# Patient Record
Sex: MALE | Race: OTHER | HISPANIC OR LATINO | URBAN - METROPOLITAN AREA
[De-identification: names, ages, dates, MRNs, and addresses within clinical notes are randomized per-mention and may not be internally consistent; named-entity substitution may affect disease eponyms.]

---

## 2024-05-21 ENCOUNTER — EMERGENCY (EMERGENCY)
Facility: HOSPITAL | Age: 24
LOS: 1 days | Discharge: HOME | End: 2024-05-21
Attending: STUDENT IN AN ORGANIZED HEALTH CARE EDUCATION/TRAINING PROGRAM | Admitting: STUDENT IN AN ORGANIZED HEALTH CARE EDUCATION/TRAINING PROGRAM
Payer: SELF-PAY

## 2024-05-21 VITALS
HEART RATE: 105 BPM | SYSTOLIC BLOOD PRESSURE: 126 MMHG | RESPIRATION RATE: 20 BRPM | TEMPERATURE: 98 F | DIASTOLIC BLOOD PRESSURE: 92 MMHG | OXYGEN SATURATION: 99 %

## 2024-05-21 LAB
ALBUMIN SERPL ELPH-MCNC: 4.3 G/DL — SIGNIFICANT CHANGE UP (ref 3.3–5)
ALP SERPL-CCNC: 83 U/L — SIGNIFICANT CHANGE UP (ref 40–120)
ALT FLD-CCNC: 17 U/L — SIGNIFICANT CHANGE UP (ref 4–41)
ANION GAP SERPL CALC-SCNC: 13 MMOL/L — SIGNIFICANT CHANGE UP (ref 7–14)
AST SERPL-CCNC: 19 U/L — SIGNIFICANT CHANGE UP (ref 4–40)
BASOPHILS # BLD AUTO: 0.05 K/UL — SIGNIFICANT CHANGE UP (ref 0–0.2)
BASOPHILS NFR BLD AUTO: 0.7 % — SIGNIFICANT CHANGE UP (ref 0–2)
BILIRUB SERPL-MCNC: 0.6 MG/DL — SIGNIFICANT CHANGE UP (ref 0.2–1.2)
BLOOD GAS VENOUS COMPREHENSIVE RESULT: SIGNIFICANT CHANGE UP
BUN SERPL-MCNC: 14 MG/DL — SIGNIFICANT CHANGE UP (ref 7–23)
CALCIUM SERPL-MCNC: 9.3 MG/DL — SIGNIFICANT CHANGE UP (ref 8.4–10.5)
CHLORIDE SERPL-SCNC: 104 MMOL/L — SIGNIFICANT CHANGE UP (ref 98–107)
CO2 SERPL-SCNC: 24 MMOL/L — SIGNIFICANT CHANGE UP (ref 22–31)
CREAT SERPL-MCNC: 0.91 MG/DL — SIGNIFICANT CHANGE UP (ref 0.5–1.3)
EGFR: 122 ML/MIN/1.73M2 — SIGNIFICANT CHANGE UP
EOSINOPHIL # BLD AUTO: 0.02 K/UL — SIGNIFICANT CHANGE UP (ref 0–0.5)
EOSINOPHIL NFR BLD AUTO: 0.3 % — SIGNIFICANT CHANGE UP (ref 0–6)
GLUCOSE SERPL-MCNC: 95 MG/DL — SIGNIFICANT CHANGE UP (ref 70–99)
HCT VFR BLD CALC: 45.1 % — SIGNIFICANT CHANGE UP (ref 39–50)
HGB BLD-MCNC: 15.6 G/DL — SIGNIFICANT CHANGE UP (ref 13–17)
IANC: 4.09 K/UL — SIGNIFICANT CHANGE UP (ref 1.8–7.4)
IMM GRANULOCYTES NFR BLD AUTO: 0.3 % — SIGNIFICANT CHANGE UP (ref 0–0.9)
INR BLD: 1.04 RATIO — SIGNIFICANT CHANGE UP (ref 0.88–1.16)
LIDOCAIN IGE QN: 16 U/L — SIGNIFICANT CHANGE UP (ref 7–60)
LYMPHOCYTES # BLD AUTO: 2.4 K/UL — SIGNIFICANT CHANGE UP (ref 1–3.3)
LYMPHOCYTES # BLD AUTO: 33.6 % — SIGNIFICANT CHANGE UP (ref 13–44)
MCHC RBC-ENTMCNC: 32.2 PG — SIGNIFICANT CHANGE UP (ref 27–34)
MCHC RBC-ENTMCNC: 34.6 GM/DL — SIGNIFICANT CHANGE UP (ref 32–36)
MCV RBC AUTO: 93 FL — SIGNIFICANT CHANGE UP (ref 80–100)
MONOCYTES # BLD AUTO: 0.57 K/UL — SIGNIFICANT CHANGE UP (ref 0–0.9)
MONOCYTES NFR BLD AUTO: 8 % — SIGNIFICANT CHANGE UP (ref 2–14)
NEUTROPHILS # BLD AUTO: 4.09 K/UL — SIGNIFICANT CHANGE UP (ref 1.8–7.4)
NEUTROPHILS NFR BLD AUTO: 57.1 % — SIGNIFICANT CHANGE UP (ref 43–77)
NRBC # BLD: 0 /100 WBCS — SIGNIFICANT CHANGE UP (ref 0–0)
NRBC # FLD: 0 K/UL — SIGNIFICANT CHANGE UP (ref 0–0)
PLATELET # BLD AUTO: 297 K/UL — SIGNIFICANT CHANGE UP (ref 150–400)
POTASSIUM SERPL-MCNC: 3.8 MMOL/L — SIGNIFICANT CHANGE UP (ref 3.5–5.3)
POTASSIUM SERPL-SCNC: 3.8 MMOL/L — SIGNIFICANT CHANGE UP (ref 3.5–5.3)
PROT SERPL-MCNC: 7.4 G/DL — SIGNIFICANT CHANGE UP (ref 6–8.3)
PROTHROM AB SERPL-ACNC: 11.6 SEC — SIGNIFICANT CHANGE UP (ref 9.5–13)
RBC # BLD: 4.85 M/UL — SIGNIFICANT CHANGE UP (ref 4.2–5.8)
RBC # FLD: 12.7 % — SIGNIFICANT CHANGE UP (ref 10.3–14.5)
SODIUM SERPL-SCNC: 141 MMOL/L — SIGNIFICANT CHANGE UP (ref 135–145)
WBC # BLD: 7.15 K/UL — SIGNIFICANT CHANGE UP (ref 3.8–10.5)
WBC # FLD AUTO: 7.15 K/UL — SIGNIFICANT CHANGE UP (ref 3.8–10.5)

## 2024-05-21 PROCEDURE — 71046 X-RAY EXAM CHEST 2 VIEWS: CPT | Mod: 26

## 2024-05-21 PROCEDURE — 99285 EMERGENCY DEPT VISIT HI MDM: CPT

## 2024-05-21 RX ORDER — SODIUM CHLORIDE 9 MG/ML
1000 INJECTION INTRAMUSCULAR; INTRAVENOUS; SUBCUTANEOUS ONCE
Refills: 0 | Status: COMPLETED | OUTPATIENT
Start: 2024-05-21 | End: 2024-05-21

## 2024-05-21 RX ORDER — ACETAMINOPHEN 500 MG
1000 TABLET ORAL ONCE
Refills: 0 | Status: COMPLETED | OUTPATIENT
Start: 2024-05-21 | End: 2024-05-21

## 2024-05-21 RX ADMIN — SODIUM CHLORIDE 1000 MILLILITER(S): 9 INJECTION INTRAMUSCULAR; INTRAVENOUS; SUBCUTANEOUS at 22:25

## 2024-05-21 RX ADMIN — Medication 400 MILLIGRAM(S): at 22:25

## 2024-05-21 NOTE — ED PROVIDER NOTE - ATTENDING APP SHARED VISIT CONTRIBUTION OF CARE
25 yo M hx Down syndrome presenting with complaints of abdominal pain since Friday. Initially, pain was epigastric, now diffuse pain. No reported nv, diarrhea or constipation. Denies testicular pain. No reported dysuria/hematuria.    VITALS: reviewed  GEN: NAD, A & O x 4  HEAD/EYES: NCAT, EOMI, anicteric sclerae,   ENT: mucus membranes moist, oropharynx WNL, trachea midline,  RESP: lungs CTA with equal breath sounds bilaterally, chest wall nontender and atraumatic  CV: heart with reg rhythm S1, S2, distal pulses intact and symmetric bilaterally  ABDOMEN: soft, nondistended, diffuse ttp- no pain elicited with deep palpation of stethoscope, no palpable masses  : no CVAT  MSK: extremities atraumatic and nontender, no edema, no asymmetry.  SKIN: warm, dry, no rash, no bruising, no cyanosis. color appropriate for ethnicity  NEURO: alert, mentating appropriately, no facial asymmetry.   PSYCH: Affect appropriate      Plan for labs, meds, ct abd/pelv r/o appendicitis vs other acute pathology, reassess

## 2024-05-21 NOTE — ED PROVIDER NOTE - CLINICAL SUMMARY MEDICAL DECISION MAKING FREE TEXT BOX
24yM w/no stated pmhx presenting to the ED with abdominal pain. Pt reports pain began a 4 days ago, had been epigastric and mild. Pain then returned  yesterday and became severe today. Pt reports pain is diffuse but worst in epigastric and lower abdominal regions. Reports associated chills. Pt denies fever, nausea, vomiting, diarrhea, cp, sob, cough, headache, dizziness or any other concerns. Recent travel from [place]. On exam pt appears uncomfortable, tearful, +epigastric and periumbilical tenderness. Concern for appendicitis, PUD/gastritis, pancreatitis, no testicular pain or urinary complaints. Plan: cbc/cmp, lipase, CT abd/pelvis, pain control. 24yM w/pmhx down syndrome presenting to the ED with abdominal pain. Pt reports pain began a 4 days ago, had been epigastric and mild. Pain then returned  yesterday and became severe today. Pt reports pain is diffuse but worst in epigastric and lower abdominal regions. Reports associated chills. Pt denies fever, nausea, vomiting, diarrhea, cp, sob, cough, headache, dizziness or any other concerns. Recent travel from [place]. On exam pt appears uncomfortable, tearful, +epigastric and periumbilical tenderness. Concern for appendicitis, PUD/gastritis, pancreatitis, no testicular pain or urinary complaints. Plan: cbc/cmp, lipase, CT abd/pelvis, pain control.

## 2024-05-21 NOTE — ED ADULT TRIAGE NOTE - CHIEF COMPLAINT QUOTE
Patient c/o abdominal pain for one day. No nausea/vomiting/diarrhea. Subjective fevers at home. Last BM today.

## 2024-05-21 NOTE — ED ADULT NURSE NOTE - NSFALLUNIVINTERV_ED_ALL_ED
Bed/Stretcher in lowest position, wheels locked, appropriate side rails in place/Call bell, personal items and telephone in reach/Instruct patient to call for assistance before getting out of bed/chair/stretcher/Non-slip footwear applied when patient is off stretcher/[place] to call system/Physically safe environment - no spills, clutter or unnecessary equipment/Purposeful proactive rounding/Room/bathroom lighting operational, light cord in reach

## 2024-05-21 NOTE — ED PROVIDER NOTE - NSFOLLOWUPINSTRUCTIONS_ED_ALL_ED_FT
Abdominal Pain, Adult  A health care provider talking to a person during a medical exam.  Pain in the abdomen (abdominal pain) can be caused by many things. In most cases, it gets better with no treatment or by being treated at home. But in some cases, it can be serious.    Your health care provider will ask questions about your medical history and do a physical exam to try to figure out what is causing your pain.    Follow these instructions at home:  Medicines    Take over-the-counter and prescription medicines only as told by your provider.  Do not take medicines that help you poop (laxatives) unless told by your provider.  General instructions    Watch your condition for any changes.  Drink enough fluid to keep your pee (urine) pale yellow.    Contact a health care provider if:  Your pain changes, gets worse, or lasts longer than expected.  You have severe cramping or bloating in your abdomen, or you vomit.  Your pain gets worse with meals, after eating, or with certain foods.  You are constipated or have diarrhea for more than 2–3 days.  You are not hungry, or you lose weight without trying.  You have signs of dehydration. These may include:  Dark pee, very little pee, or no pee.  Cracked lips or dry mouth.  Sleepiness or weakness.  You have pain when you pee (urinate) or poop.  Your abdominal pain wakes you up at night.  You have blood in your pee.  You have a fever.    Get help right away if:  You cannot stop vomiting.  Your pain is only in one part of the abdomen. Pain on the right side could be caused by appendicitis.  You have bloody or black poop (stool), or poop that looks like tar.  You have trouble breathing.  You have chest pain.  These symptoms may be an emergency. Get help right away. Call 911.  Do not wait to see if the symptoms will go away.  Do not drive yourself to the hospital.  This information is not intended to replace advice given to you by your health care provider. Make sure you discuss any questions you have with your health care provider.

## 2024-05-21 NOTE — ED PROVIDER NOTE - OBJECTIVE STATEMENT
24yM w/no stated pmhx presenting to the ED with abdominal pain. Pt reports pain began a 4 days ago, had been epigastric and mild. Pain then returned  yesterday and became severe today. Pt reports pain is diffuse but worst in epigastric and lower abdominal regions. Reports associated chills. Pt denies fever, nausea, vomiting, diarrhea, cp, sob, cough, headache, dizziness or any other concerns. Recent travel from [place].   ID [id number], mother at bedside also providing collateral 24yM w/pmhx Down Syndrome presenting to the ED with abdominal pain. Pt reports pain began a 4 days ago, had been epigastric and mild. Pain then returned  yesterday and became severe today. Pt reports pain is diffuse but worst in epigastric and lower abdominal regions. Reports associated chills. Pt denies fever, nausea, vomiting, diarrhea, cp, sob, cough, headache, dizziness or any other concerns. Recent travel from [place].   ID [id number], mother at bedside also providing collateral

## 2024-05-21 NOTE — ED PROVIDER NOTE - CONSTITUTIONAL, MLM
normal... Pt appears uncomfortable, tearful, awake, alert, oriented to person, place, time/situation and in no apparent distress.

## 2024-05-21 NOTE — ED ADULT NURSE NOTE - OBJECTIVE STATEMENT
Pt received to wellness room 2. Pt A&O x4, ambulatory. Pt Arabic speaking. Pt c/o abdominal pain intermittent since Friday. Pt endorses severe lower abdominal pain. Pt endorses some rectal pain. Pt denies dizziness, headache, vision changes, chest pain, SOB, N/V/D/C, or urinary symptoms. Respirations even and unlabored. +2 pulses in all extremities. 20G IV placed in AC. Labs drawn and sent. Medicated as per PA orders. Safety maintained. Pending lab results and imaging. Pt received to wellness room 2. Pt A&O x4, ambulatory. Pt Yi speaking. Pt c/o abdominal pain intermittent since Friday. Pt endorses severe lower abdominal pain. Pt endorses some rectal pain. Pt denies dizziness, headache, vision changes, chest pain, SOB, N/V/D/C, or urinary symptoms. Respirations even and unlabored. +2 pulses in all extremities. 20G IV placed in left forearm. Labs drawn and sent. Medicated as per PA orders. Safety maintained. Pending lab results and imaging.

## 2024-05-21 NOTE — ED PROVIDER NOTE - PROGRESS NOTE DETAILS
JENI Hartman: Pt signed out to JENI Qureshi awaiting CT scan JENI Qureshi: Patient signed out to me pending CT results. Patient had persisting pain while still waiting for CT so morphine was ordered. CT resulted and was unremarkable for acute findings. Patient felt better in the ED after treatment. Pain likely due to constipation. Patient comfortable and stable for discharge with pcp follow up. Instructed to return to the ED immediately for any worsening symptoms or new concerns.

## 2024-05-21 NOTE — ED PROVIDER NOTE - PATIENT PORTAL LINK FT
You can access the FollowMyHealth Patient Portal offered by [facility] by registering at the following website: [email or web address]. By joining [employer]’s FollowMyHealth portal, you will also be able to view your health information using other applications (apps) compatible with our system.

## 2024-05-21 NOTE — ED PROVIDER NOTE - BOWEL SOUNDS
"Session #:11  Arrival time:10:25   Departure time:11:10  Total clinic time:45:00   TIME WORKLOAD HR RPD BP O2 sat %   RESTING   96 7 168/74 95   ARM ERGO 10 NDIAYE: 40                 100 7  98   NUSTEP 20 NDIAYE:40 104 8  93   RECOVERY   99 7 164/82 97   EXERCISE TIME 30          OXYGEN: NC 3L/min  EXERCISE SUMMARY: Patient able to complete 30:00 of aerobic exercise on two machines. No signs/symptoms noted or reported, vital sign responses appropriate. Patient stable and asymptomatic at discharge.  PLAN FOR NEXT SESSION: Continue exercise prescription with goal of 38:00-45:00 total exercise time.  EDUCATION:  "Airway Clearance." Patient verbalized appropriate understanding, instructed to ask any questions during subsequent sessions.         "
present x 4 quadrants

## 2024-05-22 VITALS
HEART RATE: 55 BPM | RESPIRATION RATE: 16 BRPM | DIASTOLIC BLOOD PRESSURE: 66 MMHG | SYSTOLIC BLOOD PRESSURE: 101 MMHG | TEMPERATURE: 98 F | OXYGEN SATURATION: 100 %

## 2024-05-22 LAB
APPEARANCE UR: CLEAR — SIGNIFICANT CHANGE UP
BILIRUB UR-MCNC: NEGATIVE — SIGNIFICANT CHANGE UP
COLOR SPEC: YELLOW — SIGNIFICANT CHANGE UP
DIFF PNL FLD: NEGATIVE — SIGNIFICANT CHANGE UP
GLUCOSE UR QL: NEGATIVE MG/DL — SIGNIFICANT CHANGE UP
KETONES UR-MCNC: NEGATIVE MG/DL — SIGNIFICANT CHANGE UP
LEUKOCYTE ESTERASE UR-ACNC: NEGATIVE — SIGNIFICANT CHANGE UP
NITRITE UR-MCNC: NEGATIVE — SIGNIFICANT CHANGE UP
PH UR: 6 — SIGNIFICANT CHANGE UP (ref 5–8)
PROT UR-MCNC: SIGNIFICANT CHANGE UP MG/DL
SP GR SPEC: 1.09 — HIGH (ref 1–1.03)
UROBILINOGEN FLD QL: 0.2 MG/DL — SIGNIFICANT CHANGE UP (ref 0.2–1)

## 2024-05-22 PROCEDURE — 74177 CT ABD & PELVIS W/CONTRAST: CPT | Mod: 26,MC

## 2024-05-22 RX ORDER — FAMOTIDINE 10 MG/ML
20 INJECTION INTRAVENOUS ONCE
Refills: 0 | Status: COMPLETED | OUTPATIENT
Start: 2024-05-22 | End: 2024-05-22

## 2024-05-22 RX ORDER — MORPHINE SULFATE 50 MG/1
4 CAPSULE, EXTENDED RELEASE ORAL ONCE
Refills: 0 | Status: DISCONTINUED | OUTPATIENT
Start: 2024-05-22 | End: 2024-05-22

## 2024-05-22 RX ADMIN — Medication 30 MILLILITER(S): at 00:19

## 2024-05-22 RX ADMIN — MORPHINE SULFATE 4 MILLIGRAM(S): 50 CAPSULE, EXTENDED RELEASE ORAL at 00:19

## 2024-05-22 RX ADMIN — FAMOTIDINE 20 MILLIGRAM(S): 10 INJECTION INTRAVENOUS at 00:19

## 2024-05-23 ENCOUNTER — EMERGENCY (EMERGENCY)
Facility: HOSPITAL | Age: 24
LOS: 1 days | Discharge: HOME | End: 2024-05-23
Attending: STUDENT IN AN ORGANIZED HEALTH CARE EDUCATION/TRAINING PROGRAM | Admitting: STUDENT IN AN ORGANIZED HEALTH CARE EDUCATION/TRAINING PROGRAM
Payer: SELF-PAY

## 2024-05-23 VITALS
DIASTOLIC BLOOD PRESSURE: 62 MMHG | OXYGEN SATURATION: 99 % | HEART RATE: 97 BPM | TEMPERATURE: 99 F | RESPIRATION RATE: 18 BRPM | SYSTOLIC BLOOD PRESSURE: 110 MMHG

## 2024-05-23 LAB
ALBUMIN SERPL ELPH-MCNC: 4.3 G/DL — SIGNIFICANT CHANGE UP (ref 3.3–5)
ALP SERPL-CCNC: 69 U/L — SIGNIFICANT CHANGE UP (ref 40–120)
ALT FLD-CCNC: 17 U/L — SIGNIFICANT CHANGE UP (ref 4–41)
ANION GAP SERPL CALC-SCNC: 14 MMOL/L — SIGNIFICANT CHANGE UP (ref 7–14)
AST SERPL-CCNC: 21 U/L — SIGNIFICANT CHANGE UP (ref 4–40)
BASOPHILS # BLD AUTO: 0.04 K/UL — SIGNIFICANT CHANGE UP (ref 0–0.2)
BASOPHILS NFR BLD AUTO: 0.5 % — SIGNIFICANT CHANGE UP (ref 0–2)
BILIRUB SERPL-MCNC: 1.3 MG/DL — HIGH (ref 0.2–1.2)
BUN SERPL-MCNC: 10 MG/DL — SIGNIFICANT CHANGE UP (ref 7–23)
CALCIUM SERPL-MCNC: 9.2 MG/DL — SIGNIFICANT CHANGE UP (ref 8.4–10.5)
CHLORIDE SERPL-SCNC: 103 MMOL/L — SIGNIFICANT CHANGE UP (ref 98–107)
CO2 SERPL-SCNC: 23 MMOL/L — SIGNIFICANT CHANGE UP (ref 22–31)
CREAT SERPL-MCNC: 0.8 MG/DL — SIGNIFICANT CHANGE UP (ref 0.5–1.3)
CULTURE RESULTS: SIGNIFICANT CHANGE UP
EGFR: 128 ML/MIN/1.73M2 — SIGNIFICANT CHANGE UP
EOSINOPHIL # BLD AUTO: 0.02 K/UL — SIGNIFICANT CHANGE UP (ref 0–0.5)
EOSINOPHIL NFR BLD AUTO: 0.3 % — SIGNIFICANT CHANGE UP (ref 0–6)
GLUCOSE SERPL-MCNC: 97 MG/DL — SIGNIFICANT CHANGE UP (ref 70–99)
HCT VFR BLD CALC: 43.9 % — SIGNIFICANT CHANGE UP (ref 39–50)
HGB BLD-MCNC: 14.8 G/DL — SIGNIFICANT CHANGE UP (ref 13–17)
IANC: 5.43 K/UL — SIGNIFICANT CHANGE UP (ref 1.8–7.4)
IMM GRANULOCYTES NFR BLD AUTO: 0.1 % — SIGNIFICANT CHANGE UP (ref 0–0.9)
LYMPHOCYTES # BLD AUTO: 1.98 K/UL — SIGNIFICANT CHANGE UP (ref 1–3.3)
LYMPHOCYTES # BLD AUTO: 24.9 % — SIGNIFICANT CHANGE UP (ref 13–44)
MCHC RBC-ENTMCNC: 31.2 PG — SIGNIFICANT CHANGE UP (ref 27–34)
MCHC RBC-ENTMCNC: 33.7 GM/DL — SIGNIFICANT CHANGE UP (ref 32–36)
MCV RBC AUTO: 92.4 FL — SIGNIFICANT CHANGE UP (ref 80–100)
MONOCYTES # BLD AUTO: 0.48 K/UL — SIGNIFICANT CHANGE UP (ref 0–0.9)
MONOCYTES NFR BLD AUTO: 6 % — SIGNIFICANT CHANGE UP (ref 2–14)
NEUTROPHILS # BLD AUTO: 5.43 K/UL — SIGNIFICANT CHANGE UP (ref 1.8–7.4)
NEUTROPHILS NFR BLD AUTO: 68.2 % — SIGNIFICANT CHANGE UP (ref 43–77)
NRBC # BLD: 0 /100 WBCS — SIGNIFICANT CHANGE UP (ref 0–0)
NRBC # FLD: 0 K/UL — SIGNIFICANT CHANGE UP (ref 0–0)
PLATELET # BLD AUTO: 281 K/UL — SIGNIFICANT CHANGE UP (ref 150–400)
POTASSIUM SERPL-MCNC: 3.7 MMOL/L — SIGNIFICANT CHANGE UP (ref 3.5–5.3)
POTASSIUM SERPL-SCNC: 3.7 MMOL/L — SIGNIFICANT CHANGE UP (ref 3.5–5.3)
PROT SERPL-MCNC: 7.3 G/DL — SIGNIFICANT CHANGE UP (ref 6–8.3)
RBC # BLD: 4.75 M/UL — SIGNIFICANT CHANGE UP (ref 4.2–5.8)
RBC # FLD: 12.6 % — SIGNIFICANT CHANGE UP (ref 10.3–14.5)
SODIUM SERPL-SCNC: 140 MMOL/L — SIGNIFICANT CHANGE UP (ref 135–145)
SPECIMEN SOURCE: SIGNIFICANT CHANGE UP
WBC # BLD: 7.96 K/UL — SIGNIFICANT CHANGE UP (ref 3.8–10.5)
WBC # FLD AUTO: 7.96 K/UL — SIGNIFICANT CHANGE UP (ref 3.8–10.5)

## 2024-05-23 PROCEDURE — 99285 EMERGENCY DEPT VISIT HI MDM: CPT

## 2024-05-23 RX ORDER — IOHEXOL 300 MG/ML
30 INJECTION, SOLUTION INTRAVENOUS ONCE
Refills: 0 | Status: COMPLETED | OUTPATIENT
Start: 2024-05-23 | End: 2024-05-23

## 2024-05-23 RX ADMIN — IOHEXOL 30 MILLILITER(S): 300 INJECTION, SOLUTION INTRAVENOUS at 23:59

## 2024-05-23 NOTE — ED ADULT NURSE NOTE - CHIEF COMPLAINT QUOTE
c/o severe rectal pain, pt is tearful in triage, family reports attempted multiple enemas, and laxatives to alleviate constipation, phx of down syndrome, primarily Ivorian speaking.

## 2024-05-23 NOTE — ED ADULT NURSE NOTE - OBJECTIVE STATEMENT
Pt received on stretcher, mother at bedside, pt has sown syndrome as per mother, pt is calm and cooperative with staff. Pt c/o rectal pain from constipation. MD at bedside with pt.

## 2024-05-23 NOTE — ED ADULT TRIAGE NOTE - CHIEF COMPLAINT QUOTE
c/o severe rectal pain, pt is tearful in triage, family reports attempted multiple enemas, and laxatives to alleviate constipation, phx of down syndrome, primarily [place]ian speaking.

## 2024-05-24 VITALS
HEART RATE: 66 BPM | OXYGEN SATURATION: 99 % | SYSTOLIC BLOOD PRESSURE: 106 MMHG | DIASTOLIC BLOOD PRESSURE: 58 MMHG | RESPIRATION RATE: 18 BRPM | TEMPERATURE: 98 F

## 2024-05-24 PROBLEM — Z78.9 OTHER SPECIFIED HEALTH STATUS: Chronic | Status: ACTIVE | Noted: 2024-05-21

## 2024-05-24 LAB
APPEARANCE UR: CLEAR — SIGNIFICANT CHANGE UP
BILIRUB UR-MCNC: NEGATIVE — SIGNIFICANT CHANGE UP
COLOR SPEC: YELLOW — SIGNIFICANT CHANGE UP
DIFF PNL FLD: NEGATIVE — SIGNIFICANT CHANGE UP
GLUCOSE UR QL: NEGATIVE MG/DL — SIGNIFICANT CHANGE UP
KETONES UR-MCNC: ABNORMAL MG/DL
LEUKOCYTE ESTERASE UR-ACNC: NEGATIVE — SIGNIFICANT CHANGE UP
NITRITE UR-MCNC: NEGATIVE — SIGNIFICANT CHANGE UP
PH UR: 6 — SIGNIFICANT CHANGE UP (ref 5–8)
PROT UR-MCNC: NEGATIVE MG/DL — SIGNIFICANT CHANGE UP
SP GR SPEC: 1.02 — SIGNIFICANT CHANGE UP (ref 1–1.03)
UROBILINOGEN FLD QL: 0.2 MG/DL — SIGNIFICANT CHANGE UP (ref 0.2–1)

## 2024-05-24 PROCEDURE — 74177 CT ABD & PELVIS W/CONTRAST: CPT | Mod: 26,MC

## 2024-05-24 RX ORDER — MULTIVIT WITH MIN/MFOLATE/K2 340-15/3 G
296 POWDER (GRAM) ORAL ONCE
Refills: 0 | Status: COMPLETED | OUTPATIENT
Start: 2024-05-24 | End: 2024-05-24

## 2024-05-24 RX ORDER — SODIUM CHLORIDE 9 MG/ML
1000 INJECTION INTRAMUSCULAR; INTRAVENOUS; SUBCUTANEOUS ONCE
Refills: 0 | Status: COMPLETED | OUTPATIENT
Start: 2024-05-24 | End: 2024-05-24

## 2024-05-24 RX ORDER — POLYETHYLENE GLYCOL 3350 17 G/17G
17 POWDER, FOR SOLUTION ORAL
Qty: 1 | Refills: 0
Start: 2024-05-24 | End: 2024-06-22

## 2024-05-24 RX ORDER — MINERAL OIL
133 OIL (ML) MISCELLANEOUS ONCE
Refills: 0 | Status: COMPLETED | OUTPATIENT
Start: 2024-05-24 | End: 2024-05-24

## 2024-05-24 RX ADMIN — Medication 296 MILLILITER(S): at 05:55

## 2024-05-24 RX ADMIN — Medication 133 MILLILITER(S): at 09:10

## 2024-05-24 RX ADMIN — SODIUM CHLORIDE 1000 MILLILITER(S): 9 INJECTION INTRAMUSCULAR; INTRAVENOUS; SUBCUTANEOUS at 09:10

## 2024-05-24 NOTE — ED ADULT NURSE REASSESSMENT NOTE - NS ED NURSE REASSESS COMMENT FT1
2nd attempt with mineral oil enema.
Pt given enema and citroma as per MD orders, pt has not made a movement as yet.
Pt received from night shift handoff. pt is AxO 3. pt respirations even and unlabored. pt denies headaches, dizziness, n/v/d, abdominal pain, chest pain, SOB, or fever like symptoms. Pt has a 20G to  the right AC. pt medicated as prescribed.
Break RN note- Patient resting in bed, breathing even and nonlabored. Patient uncomfortable after drinking oral contrast. Dr. Dubin made aware. Awaiting CT. Safety maintained. Patient stable upon exiting the room.

## 2024-05-24 NOTE — ED PROVIDER NOTE - CLINICAL SUMMARY MEDICAL DECISION MAKING FREE TEXT BOX
23 yo M with one week of constipation and now RLQ and suprapubic abdominal pain. ON exam RLQ and suprapubic ttp, no stool to disimpact on rectal exam. Labs, UA/UC, and CT abdomen pelvis to assess for appendicitis vs obstruction. Dispo pending results. Odalys PGY1

## 2024-05-24 NOTE — ED PROVIDER NOTE - NSFOLLOWUPINSTRUCTIONS_ED_ALL_ED_FT
You came to the emergency department for abdominal pain and Constipation.  We performed labs and imaging which were normal.  You are given magnesium citrate and a smog enema.    Please drink plenty of fluids and eat fiber rich foods.  Please return to emergency department if you have new or worsening pain or if you are otherwise concerned. You came to the emergency department for abdominal pain and Constipation.  We performed labs and imaging which were normal.  You are given magnesium citrate and a smog enema. You performed a CT scan that did not show any obstruction of the bowel.  There was no stool in the rectum and contrast did pass through to the rectum.  Given this there is no indication for a manual disimpaction at this time.  We recommend continuing with medical therapy to help clear out his intestines.     As we discussed in the ER, he can take medications to help with pain including You may take 650 mg acetaminophen every six hours as needed for pain and Pepcid 40 mg daily.     Please follow up with a GI doctor for further management. All of your results from today are available to take with you to your appointment in [place].     Please drink plenty of fluids and eat fiber rich foods.    ** See handout provided in [place] discussing constipation and management **    Clean-Out of Colon for Constipation:  1.  Take Dulcolax Laxative tablets - 10 mg total.  2.  Dissolve 10 measuring capfuls of Miralax in 24 ounces of Gatorade, water, or juice.  3.  Drink this solution within 2 hours.  4.  Take another 10 mg of Dulcolax Laxative an hour after drinking the Miralax.    The stool should become watery and yellow by the next day.  If it has not, repeat the Miralax the next day, but without the dulcolax.  Do not give fiber containing foods during the clean out period.    Maintenance therapy:    After the clean out is accomplished, start maintenance (daily) therapy with 1 capful of Miralax dissolved in water or juice    Constipation, Adult  Constipation is when a person: Poops (has a bowel movement) fewer times in a week than normal, Has a hard time pooping, Has poop that is dry, hard, or bigger than normal.    Follow these instructions at home:    Eating and drinking: Eat foods that have a lot of fiber, such as: Fresh fruits and vegetables. Whole grains. Beans. Eat less of foods that are high in fat, low in fiber, or overly processed, such as: French fries. Hamburgers. Cookies. Candy. Soda.   Drink enough fluid to keep your pee (urine) clear or pale yellow.    General instructions   Exercise regularly or as told by your doctor.  Go to the restroom when you feel like you need to poop. Do not hold it in.  Take over-the-counter and prescription medicines only as told by your doctor. These include any fiber supplements.  Do pelvic floor retraining exercises, such as: Doing deep breathing while relaxing your lower belly (abdomen). Relaxing your pelvic floor while pooping.   Watch your condition for any changes. Keep all follow-up visits as told by your doctor. This is important.    Contact a doctor if:  You have pain that gets worse. You have a fever. You have not pooped for 4 days. You throw up (vomit). You are not hungry. You lose weight. You are bleeding from the anus. You have thin, pencil-like poop (stool).  Get help right away if:  You have a fever, and your symptoms suddenly get worse. You leak poop or have blood in your poop. Your belly feels hard or bigger than normal (is bloated). You have very bad belly pain. You feel dizzy or you faint.    ***********************************************************************************************************************  Acudió al departamento de emergencias por dolor abdominal y estreñimiento. Realizamos análisis y estudios de imagen que resultaron normales. Se le administró citrato de magnesio y un enema de smog. Se realizó mariella tomografía computarizada que no mostró ninguna obstrucción del intestino. No había heces en el recto y el contraste pasó al recto. Dado esto, no hay indicación para marilela desimpactación manual en antonieta momento. Recomendamos continuar con la terapia médica para ayudar a limpiar aiden intestinos.    [place] discutimos en el departamento de emergencias, puede placido medicamentos para ayudar con el dolor, incluyendo 650 mg de acetaminofén cada seis horas según sea necesario para el dolor y 40 mg de Pepcid diariamente.    Por favor, bola un seguimiento con un médico especialista en gastroenterología para un manejo adicional. Todos aiden resultados de hilaria están disponibles para llevar con usted a varma liv en [place].    Por favor, edelmira mucha agua y coma alimentos ricos en fibra.    ** Bobby el folleto proporcionado en español que discute el estreñimiento y varma manejo **    Limpieza del colon para el estreñimiento:  1. [place] tabletas laxantes Dulcolax - 10 mg en total.  2. Disuelva 10 tapones medidores de Miralax en 24 onzas de Gatorade, agua o jugo.  3. Edelmira esta solución dentro de las 2 horas.  4. Loyola otros 10 mg de laxante Dulcolax mariella hora después de beber el Miralax.    Las heces deben volverse acuosas y soto para el día siguiente. Si no lo hacen, repita el Miralax al día siguiente, shelby sin el Dulcolax. No dé alimentos que contengan fibra adriano el período de limpieza.    Terapia de mantenimiento:  Después de que se haya realizado la limpieza, comience la terapia de mantenimiento (diaria) con 1 tapón de Miralax disuelto en agua o jugo.    Estreñimiento en adultos:  El estreñimiento ocurre cuando mariella persona: Hace catalino (tiene mariella evacuación intestinal) menos veces en mariella semana de lo normal, Tiene dificultades para hacer catalino, Tiene heces que son secas, duras o más grandes de lo normal.    Siga estas instrucciones en casa:    Alimentación y consumo de líquidos: Coma alimentos que tengan mucha fibra, akiko: Frutas y verduras frescas. Granos enteros. Frijoles. Coma menos alimentos que deirdre altos en grasa, bajos en fibra o muy procesados, akiko: Mino fritas. Hamburguesas. Galletas. Dulces. Refrescos. Edelmira suficiente líquido para mantener varma orina kylie o de color amarillo vianney.    Instrucciones generales:  Ejercítese regularmente o según lo indicado por avrma médico.  Vaya al baño cuando sienta la necesidad de hacer catalino. No lo retenga.  Loyola medicamentos de venta bharathi y con receta solo según lo indicado por varma médico. [place] incluye cualquier suplemento de fibra.  Bola ejercicios de reentrenamiento del suelo pélvico, akiko: Respirar profundamente mientras relaja varma vientre bajo (abdomen). Relajar el suelo pélvico mientras hace catalino.  Esté atento a cualquier cambio en varma condición. Mantenga todas las visitas de seguimiento según lo indicado por varma médico. [place] es importante.    Póngase en contacto con un médico si:  El dolor empeora. Tiene fiebre. No hassan hecho catalino adriano 4 días. Vomita. No tiene hambre. Pierde peso. Está sangrando por el ano. Tiene heces delgadas, parecidas a lápices.    Obtenga ayuda de inmediato si:  Tiene fiebre y aiden síntomas empeoran repentinamente. Pierde heces o tiene moshe en las heces. Varma vientre se siente consuelo o más nyla de lo normal (está hinchado). Tiene un dolor de barrett muy intenso. Se siente mareado o se desmaya.

## 2024-05-24 NOTE — ED PROVIDER NOTE - PHYSICAL EXAMINATION
Esha Morrow DO (PGY1)   Physical Exam:    Gen: NAD, non-toxic appearing, able to ambulate without assistance  Lung: CTAB, no respiratory distress, no wheezes/rhonchi/rales B/L  CV: RRR, no murmurs, rubs or gallops  Abd: RLQ ttp and suprapubic region ttp  Rectal: Chaperoned by Dr Morales - no stool in the rectal vault. no internal or external hemorrhoids noted.

## 2024-05-24 NOTE — ED PROVIDER NOTE - OBJECTIVE STATEMENT
25 yo M pmhx of down syndrome presenting for RLQ and suprapubic abdominal pain and rectal pain when attempting to stool that has been ongoing for the past week. Patient was here 2 days prior for similar symptoms and was dx with constipation. Mom states that she has been administering hourly enemas and laxative suppositories (Doculax) for the past two days with no relief and was told by her PCP to bring son in for disimpaction. Passing flatus but last normal BM was 1 week prior. has been passing small amounts of stool. Denies fever, chills, nausea, vomiting, diarrhea, hematuria, dysuria, chest pain, SOB, or any other symptoms at this time.

## 2024-05-24 NOTE — ED PROVIDER NOTE - PATIENT PORTAL LINK FT
You can access the FollowMyHealth Patient Portal offered by [facility] by registering at the following website: [email or web address]. By joining [employer]’s FollowMyHealth portal, you will also be able to view your health information using other applications (apps) compatible with our system.

## 2024-05-24 NOTE — ED PROVIDER NOTE - ATTENDING CONTRIBUTION TO CARE
24-year-old male past history of Down syndrome presenting with right lower quadrant/suprapubic pain.  Per mother and aunt at bedside was here 2 days prior for similar symptoms and diagnosed with constipation.  They report they have been using laxatives and enemas at home without significant bowel movement and was advised to come back to ED.  Symptoms have been going on for approximately 1 week.  They deny normal p.o. intake.  No nausea vomiting.  They deny fevers, chills, trouble breathing.  Has been in pain but otherwise at baseline mental status.  Exam as above  Given right lower quadrant tenderness will check labs, repeat CT at this time.  Plan: Labs, simply, CT with p.o. contrast, reassess.  Patient signed out at end of my shift.

## 2024-05-24 NOTE — ED PROVIDER NOTE - PROGRESS NOTE DETAILS
CT continues to not show actionable findings. Mild stool burden noted on CT. Discussed administration of mag citrate at home vs in ED and family would like this to be administered in the ED as he continues to have pain and constipation. Will administered mag citrate and smog enema. Odalys PGY1 24 yoM with DS and one week of persistent constipation and abdominal pain 23 yo M with DS and one week of persistent constipation and abdominal pain. Labs and imaging are not actionable although CT did show some stool burden. SMOG enema and mag citrate, BM, and KIERRA. Odalys PGY1 Yareli HILL:  services used Purnima # [id number], 24-year-old male history of Down syndrome now presenting to the hospital for concerns of constipation.  Patient was in the ED on May 21, was discharged on laxatives, mother reports using suppositories/enema/lingess to no relief.  Patient having constipation and reports diffuse abdominal pain, feels nauseous with no vomitings.  Mother also indicates patient having undocumented fever over the past few days.  Patient on examination is hemodynamically stable, has received smog enema an hour prior and has not passed any stools.  On examination reports tenderness to epigastrium and suprapubic area, denies urinary symptoms.   Lab test results and CT scan findings informed to the patient/mother.  Current plan includes IV fluids and monitoring until patient passes stools. [place], PGY-3, EM: ID: [id number] Sharmin ([place])  Extensive discussion with patient's mom using ID  above with .  discussed findings thus far of labs and imaging.  There is no stool in the rectal vault, contrast passes through, no obstruction.  Based on these findings patient has received a number of medications to help pass the stool. At this time there is no additional intervention to be managed in the hospital. Advised that given the absence of any electrolyte abnormalities or ct findings, no procedures to be performed. Pt likely developed this over time and constipation will need to be managed w/ medications and outpatient GI. will send rx and provide all results. return precautions communicated along w/ appropriate follow up. recommended increasing water intake and fiber.

## 2024-05-25 LAB
CULTURE RESULTS: SIGNIFICANT CHANGE UP
SPECIMEN SOURCE: SIGNIFICANT CHANGE UP
